# Patient Record
Sex: FEMALE | Race: WHITE | Employment: OTHER | URBAN - METROPOLITAN AREA
[De-identification: names, ages, dates, MRNs, and addresses within clinical notes are randomized per-mention and may not be internally consistent; named-entity substitution may affect disease eponyms.]

---

## 2018-07-10 PROBLEM — R93.1 AGATSTON CORONARY ARTERY CALCIUM SCORE LESS THAN 100: Status: ACTIVE | Noted: 2018-07-10

## 2019-08-12 PROBLEM — R07.82 INTERCOSTAL PAIN: Status: ACTIVE | Noted: 2019-08-12

## 2019-08-12 PROBLEM — R07.82 INTERCOSTAL PAIN: Status: RESOLVED | Noted: 2019-08-12 | Resolved: 2019-08-12

## 2019-08-12 PROBLEM — R00.0 TACHYCARDIA: Status: ACTIVE | Noted: 2019-08-12

## 2019-08-21 ENCOUNTER — HOSPITAL ENCOUNTER (OUTPATIENT)
Dept: CT IMAGING | Age: 64
Discharge: HOME | End: 2019-08-21
Attending: INTERNAL MEDICINE
Payer: MEDICARE

## 2019-08-21 DIAGNOSIS — R07.2 PRECORDIAL PAIN: ICD-10-CM

## 2019-08-21 DIAGNOSIS — R93.1 AGATSTON CORONARY ARTERY CALCIUM SCORE LESS THAN 100: ICD-10-CM

## 2019-08-21 LAB — CREAT BLD-MCNC: 0.7 MG/DL (ref 0.8–1.5)

## 2019-08-21 PROCEDURE — 74011000258 HC RX REV CODE- 258: Performed by: INTERNAL MEDICINE

## 2019-08-21 PROCEDURE — 74011636320 HC RX REV CODE- 636/320: Performed by: INTERNAL MEDICINE

## 2019-08-21 PROCEDURE — 75574 CT ANGIO HRT W/3D IMAGE: CPT

## 2019-08-21 PROCEDURE — 82565 ASSAY OF CREATININE: CPT

## 2019-08-21 RX ORDER — SODIUM CHLORIDE 0.9 % (FLUSH) 0.9 %
10 SYRINGE (ML) INJECTION
Status: COMPLETED | OUTPATIENT
Start: 2019-08-21 | End: 2019-08-21

## 2019-08-21 RX ADMIN — Medication 10 ML: at 08:17

## 2019-08-21 RX ADMIN — IOPAMIDOL 75 ML: 755 INJECTION, SOLUTION INTRAVENOUS at 08:17

## 2019-08-21 RX ADMIN — SODIUM CHLORIDE 100 ML: 900 INJECTION, SOLUTION INTRAVENOUS at 08:17

## 2020-07-23 PROBLEM — R25.2 MUSCLE CRAMPS: Status: ACTIVE | Noted: 2020-07-23

## 2020-10-28 PROBLEM — G89.29 CHRONIC RIGHT-SIDED LOW BACK PAIN WITH RIGHT-SIDED SCIATICA: Status: ACTIVE | Noted: 2020-10-28

## 2020-10-28 PROBLEM — M54.41 CHRONIC RIGHT-SIDED LOW BACK PAIN WITH RIGHT-SIDED SCIATICA: Status: ACTIVE | Noted: 2020-10-28

## 2022-03-18 PROBLEM — G89.29 CHRONIC RIGHT-SIDED LOW BACK PAIN WITH RIGHT-SIDED SCIATICA: Status: ACTIVE | Noted: 2020-10-28

## 2022-03-18 PROBLEM — M54.41 CHRONIC RIGHT-SIDED LOW BACK PAIN WITH RIGHT-SIDED SCIATICA: Status: ACTIVE | Noted: 2020-10-28

## 2022-03-19 PROBLEM — R00.0 TACHYCARDIA: Status: ACTIVE | Noted: 2019-08-12

## 2022-03-19 PROBLEM — R93.1 AGATSTON CORONARY ARTERY CALCIUM SCORE LESS THAN 100: Status: ACTIVE | Noted: 2018-07-10

## 2022-03-20 PROBLEM — R25.2 MUSCLE CRAMPS: Status: ACTIVE | Noted: 2020-07-23

## 2022-09-07 ENCOUNTER — OFFICE VISIT (OUTPATIENT)
Dept: CARDIOLOGY CLINIC | Age: 67
End: 2022-09-07

## 2022-09-07 VITALS
BODY MASS INDEX: 41.11 KG/M2 | DIASTOLIC BLOOD PRESSURE: 70 MMHG | SYSTOLIC BLOOD PRESSURE: 138 MMHG | WEIGHT: 240.8 LBS | HEART RATE: 63 BPM | HEIGHT: 64 IN

## 2022-09-07 DIAGNOSIS — I45.10 RIGHT BUNDLE BRANCH BLOCK (RBBB): ICD-10-CM

## 2022-09-07 DIAGNOSIS — E66.01 CLASS 3 SEVERE OBESITY DUE TO EXCESS CALORIES WITH SERIOUS COMORBIDITY AND BODY MASS INDEX (BMI) OF 40.0 TO 44.9 IN ADULT (HCC): ICD-10-CM

## 2022-09-07 DIAGNOSIS — R93.1 AGATSTON CORONARY ARTERY CALCIUM SCORE LESS THAN 100: ICD-10-CM

## 2022-09-07 DIAGNOSIS — I10 PRIMARY HYPERTENSION: Primary | ICD-10-CM

## 2022-09-07 DIAGNOSIS — E78.00 PURE HYPERCHOLESTEROLEMIA: ICD-10-CM

## 2022-09-07 PROBLEM — E66.813 CLASS 3 SEVERE OBESITY DUE TO EXCESS CALORIES WITH SERIOUS COMORBIDITY AND BODY MASS INDEX (BMI) OF 40.0 TO 44.9 IN ADULT: Status: ACTIVE | Noted: 2022-09-07

## 2022-09-07 PROCEDURE — 93000 ELECTROCARDIOGRAM COMPLETE: CPT | Performed by: INTERNAL MEDICINE

## 2022-09-07 PROCEDURE — 99214 OFFICE O/P EST MOD 30 MIN: CPT | Performed by: INTERNAL MEDICINE

## 2022-09-07 PROCEDURE — 1123F ACP DISCUSS/DSCN MKR DOCD: CPT | Performed by: INTERNAL MEDICINE

## 2022-09-07 ASSESSMENT — ENCOUNTER SYMPTOMS: SHORTNESS OF BREATH: 0
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
## 2023-01-03 ENCOUNTER — OFFICE VISIT (OUTPATIENT)
Dept: CARDIOLOGY CLINIC | Age: 68
End: 2023-01-03

## 2023-01-03 VITALS
SYSTOLIC BLOOD PRESSURE: 130 MMHG | DIASTOLIC BLOOD PRESSURE: 82 MMHG | HEART RATE: 60 BPM | BODY MASS INDEX: 42.68 KG/M2 | WEIGHT: 250 LBS | HEIGHT: 64 IN

## 2023-01-03 DIAGNOSIS — R00.2 PALPITATIONS: ICD-10-CM

## 2023-01-03 DIAGNOSIS — E66.01 CLASS 3 SEVERE OBESITY DUE TO EXCESS CALORIES WITH SERIOUS COMORBIDITY AND BODY MASS INDEX (BMI) OF 40.0 TO 44.9 IN ADULT (HCC): ICD-10-CM

## 2023-01-03 DIAGNOSIS — E78.2 MIXED HYPERLIPIDEMIA: ICD-10-CM

## 2023-01-03 DIAGNOSIS — I10 PRIMARY HYPERTENSION: ICD-10-CM

## 2023-01-03 DIAGNOSIS — R93.1 AGATSTON CORONARY ARTERY CALCIUM SCORE LESS THAN 100: Primary | ICD-10-CM

## 2023-01-03 DIAGNOSIS — I45.10 RIGHT BUNDLE BRANCH BLOCK (RBBB): ICD-10-CM

## 2023-01-03 PROCEDURE — 1123F ACP DISCUSS/DSCN MKR DOCD: CPT | Performed by: INTERNAL MEDICINE

## 2023-01-03 PROCEDURE — 99214 OFFICE O/P EST MOD 30 MIN: CPT | Performed by: INTERNAL MEDICINE

## 2023-01-03 PROCEDURE — 3079F DIAST BP 80-89 MM HG: CPT | Performed by: INTERNAL MEDICINE

## 2023-01-03 PROCEDURE — 3075F SYST BP GE 130 - 139MM HG: CPT | Performed by: INTERNAL MEDICINE

## 2023-01-03 ASSESSMENT — ENCOUNTER SYMPTOMS
WHEEZING: 0
EYE REDNESS: 0
STRIDOR: 0
HEMATEMESIS: 0
ABDOMINAL PAIN: 0
HEMOPTYSIS: 0
HOARSE VOICE: 0
HEMATOCHEZIA: 0
DOUBLE VISION: 0

## 2023-01-03 NOTE — PROGRESS NOTES
[facility] CARDIOLOGY  [address] [facility], [address] [facility], [address] [place] [address]  PHONE: [phone]          23    NAME:  Joey Hawthorne  : 1955  MRN: [id number]         SUBJECTIVE:   Joey Hawthorne is a 78 y.o. female seen for a visit regarding the following:     Chief Complaint   Patient presents with    Hypertension    Coronary Artery Disease    Hyperlipidemia           HPI:    Cardio problem list:  1. Coronary artery disease-based on an elevated CT coronary calcium score  -CT coronary angiogram from 2019 showed a total calcium score of 15-located in the RCA, left main was normal, normal LAD and circumflex coronary arteries. Minor nonobstructive calcification noted in the right coronary artery. 2.  Bifascicular block with right bundle branch block and left anterior fascicular block  3. Hypertension  4. Hyperlipidemia   5. Palpitations    I saw Ms Arminda Reeves who is a pleasant 79-year-old woman in cardiovascular follow-up for coronary artery disease-based on an elevated CT coronary calcium score, bifascicular block with right bundle branch block and left anterior fascicular block, with known underlying hypertension and hyperlipidemia. Coronary artery disease-based on elevated CT coronary calcium score-no complaints of any angina in any way. She has some chronic dyspnea on exertion but she attributes this to her weight. No significant lower extremity edema orthopnea PND. Bifascicular block-has a right bundle branch block with a left anterior fascicular block. No complaints of any presyncope or syncope. No TIAs or strokelike symptoms with regards to her palpitations. Hypertension-elevated blood pressures in the past although well controlled now with metoprolol and losartan/hydrochlorothiazide combination. Denies headaches or blurry vision. No heart failure-like symptoms as mentioned above. Hyperlipidemia-remains on statin therapy with no myalgias.     Past Medical History, Past Surgical History, Family history, Social History, and Medications were all reviewed with the patient today and updated as necessary. Allergies   Allergen Reactions    Penicillins Hives     Pt states not allergic to this med    Adalimumab      Other reaction(s): Itching-Allergy  Humira    Clarithromycin Other (See Comments)    Hydroxychloroquine      Other reaction(s): Other- (not listed) - Allergy    Lidocaine-Menthol      Other reaction(s): Other- (not listed) - Allergy    Metronidazole Other (See Comments)     Heart racing    Sulfa Antibiotics      Other reaction(s): Other- (not listed) - Allergy     Patient Active Problem List   Diagnosis    Chronic right-sided low back pain with right-sided sciatica    S/P laparoscopic hysterectomy    PAC (premature atrial contraction)    Right bundle branch block (RBBB)    Agatston coronary artery calcium score less than 100    Precordial pain    Thyroid mass    Primary hypertension    Pure hypercholesterolemia    Tachycardia    Muscle cramps    Class 3 severe obesity due to excess calories with serious comorbidity and body mass index (BMI) of 40.0 to 44.9 in [facility])    Mixed hyperlipidemia    Palpitations     Past Medical History:   Diagnosis Date    Arrhythmia     Arthritis     Asthma     uses inhaler daily, flares-up when exercises    Cancer ([address]     bladder, cervix, skin, thyroid    GERD (gastroesophageal reflux disease)     controlled with med    HTN (hypertension) 2/19/2016    Hypercholesterolemia     controlled with med    Hypertension     controlled with med    Leukorrhea, not specified as infective     Lumbago     Malignant neoplasm of bladder, part unspecified     Malignant neoplasm of ovary ([address]     Malignant neoplasm of thyroid gland ([address]     Other ill-defined conditions(799.89)     pt. stated she was told she has an  enlarged heart on ECHO in 2006, ECHO results placed on chart and results within anesthesia surgical protocol.  Pt states she last saw cardiologist     Palpitations     Polyp of vagina     Precordial chest pain 2016    Right bundle branch block (RBBB) 2016    Thyroid disease     hx of thyroid cancer; s/p total thyroidectomy ; pt takes synthroid daily    Unspecified adverse effect of anesthesia     headaches    Unspecified symptom associated with female genital organs     Urethral caruncle      Past Surgical History:   Procedure Laterality Date    CHOLECYSTECTOMY      COLONOSCOPY      HYSTERECTOMY (CERVIX STATUS UNKNOWN)      THYROIDECTOMY      total    TUBAL LIGATION       Family History   Problem Relation Age of Onset    Other Neg Hx     Post-op Cognitive Dysfunction Neg Hx     Emergence Delirium Neg Hx     Post-op Nausea/Vomiting Neg Hx     Delayed Awakening Neg Hx     Pseudochol. Deficiency Neg Hx     Malig Hypertherm Neg Hx     Stroke Father     Diabetes Father     Hypertension Father     Hypertension Mother     Heart Disease Father      Social History     Tobacco Use    Smoking status: Former     Types: Cigarettes     Quit date: 2004     Years since quittin.0    Smokeless tobacco: Never   Substance Use Topics    Alcohol use: No     Current Outpatient Medications   Medication Sig Dispense Refill    BIOTIN PO Take by mouth      TURMERIC PO Take by mouth      acetaminophen (TYLENOL) 650 MG extended release tablet Take 650 mg by mouth daily      albuterol sulfate  (90 Base) MCG/ACT inhaler Inhale 2 puffs into the lungs daily      vitamin D 25 MCG (1000 UT) CAPS Take by mouth daily      docusate (COLACE, DULCOLAX) 100 MG CAPS Take 100 mg by mouth daily      ipratropium (ATROVENT) 0.06 % nasal spray 2 sprays 4 times daily      levothyroxine (SYNTHROID) 137 MCG tablet Take 137 mcg by mouth every other day      levothyroxine (SYNTHROID) 150 MCG tablet Take 150 mcg by mouth every other day      loratadine (CLARITIN) 10 MG capsule Take by mouth      LORazepam (ATIVAN) 0.5 MG tablet Take 0.5 mg by mouth daily. losartan-hydroCHLOROthiazide (HYZAAR) 50-12.5 MG per tablet Take 1 tablet by mouth daily      metoprolol succinate (TOPROL XL) 50 MG extended release tablet Take 50 mg by mouth daily      omeprazole (PRILOSEC) 20 MG delayed release capsule Take 40 mg by mouth      rosuvastatin (CRESTOR) 20 MG tablet Take 20 mg by mouth      venlafaxine (EFFEXOR XR) 37.5 MG extended release capsule Take by mouth daily      venlafaxine (EFFEXOR XR) 150 MG extended release capsule Take by mouth daily      zolpidem (AMBIEN) 10 MG tablet Take by mouth. No current facility-administered medications for this visit. Review of Systems   Constitutional: Negative for chills and fever. HENT:  Negative for ear discharge, hoarse voice and stridor. Eyes:  Negative for double vision and redness. Cardiovascular:  Negative for cyanosis and syncope. Respiratory:  Negative for hemoptysis and wheezing. Endocrine: Negative for polydipsia and polyphagia. Hematologic/Lymphatic: Negative for adenopathy. Skin:  Negative for itching and rash. Musculoskeletal:  Negative for joint swelling and muscle weakness. Gastrointestinal:  Negative for abdominal pain, hematemesis and hematochezia. Genitourinary:  Negative for flank pain and nocturia. Neurological:  Negative for focal weakness and seizures. Psychiatric/Behavioral:  Negative for altered mental status and suicidal ideas. Allergic/Immunologic: Negative for hives. PHYSICAL EXAM:    /82   Pulse 60   Ht 5' 3.5\" (1.613 m)   Wt 250 lb (113.4 kg)   BMI 43.59 kg/m²      Physical Exam    General: Alert and oriented in no acute distress, ambulates with a cane  HEENT: Head is normocephalic, atraumatic, pupils are equal bilaterally, throat appears to be clear  Neck: No significant jugular venous distention no cervical bruits  Cardiovascular: S1 and S2 heard, regular rate and rhythm, no significant murmurs rubs or gallops.    Respiratory: Clear to auscultation bilaterally with no adventitious sounds, respirations are normal  Abdomen: Soft, nontender, nondistended, bowel sounds present. Extremities: No cyanosis clubbing or edema  Peripheral pulses: Bilateral radial artery pulses are palpated. Bilateral pedal pulses are well felt. Neuro: No facial droop and no gross focal motor deficits  Lymphatic: No significant cervical lymphadenopathy noted. Musculoskeletal: No significant redness or swelling noted in all exposed joints. Skin: No significant rashes noted the of the exposed regions. Medical problems and test results were reviewed with the patient today. No results found for this or any previous visit (from the past 672 hour(s)). Lab Results   Component Value Date/Time    CHOL 158 02/24/2021 02:18 PM    HDL 71 02/24/2021 02:18 PM    VLDL 18 02/24/2021 02:18 PM   ,hemoglobin, basic metabolic panel, No results found for: TSH, TSH2, TSH3 ,  Lab Results   Component Value Date/Time    GFRAA >60 08/21/2019 08:07 AM      Lab Results   Component Value Date    LDLCALC 69 02/24/2021      Lab Results   Component Value Date    CREATININE 0.7 (L) 08/21/2019      No results found for this or any previous visit. No results found for: HGB   No results found for: PLT     Blood work from September 2022 showed a total cholesterol of 142, HDL 58, triglycerides 107 and LDL at 63 hemoglobin was 12.7, platelets 458 sodium 141, potassium 4.2, creatinine 0.63, TSH 2.37, A1c 5.7      ASSESSMENT and PLAN      Agatston coronary artery calcium score less than 100  -Stable with no complaints of any angina-continue beta-blocker therapy. Continue statin therapy. Primary hypertension  Well-controlled with metoprolol succinate 50 mg once daily and losartan/hydrochlorothiazide 50/12.5 mg once daily    Right bundle branch block (RBBB)  Along with left anterior fascicular block-being [id number] metoprolol at current dosing.     Mixed hyperlipidemia  Continue rosuvastatin 20 mg every evening-last LDL was 63 at goal.    Class 3 severe obesity due to excess calories with serious comorbidity and body mass index (BMI) of 40.0 to 44.9 in adult [facility])  Understands the need for weight loss-has gained 30 pounds over the past year. Palpitations  -Continue metoprolol succinate. Episodes are far less frequent at this point. Overall Impression  -I made no changes with her medical therapy. Blood pressures and heart rates are excellent. Most recent lab work done through [facility] in September and November were reviewed by me and were all within normal range. Lipids are under excellent control. She has gained about 30 pounds in 2022 and understands the need for weight loss. She said she would try and get onto a diet within the near future. She will also get into a routine cardiovascular exercise program.  She said she used to walk regularly in the past.  She has an underlying bifascicular block but no evidence of any additional first-degree AV block. On metoprolol succinate but this has helped her palpitations and we will continue it. She does have a family history of A. fib. I would only need to see her in follow-up in about 6 months time. Although she has coronary artery disease based on her elevated CT coronary calcium score, it is well controlled with statin therapy and beta-blocker therapy with no complaints of angina. Return in about 6 months (around 7/3/2023) for htn, hld, cad. Thank you for allowing us to participate in the care of your patient. If you have any further questions, please do not hesitate to contact us.   Sincerely,        Hira Silva MD   1/3/2023

## 2023-01-03 NOTE — PATIENT INSTRUCTIONS
-Very important to increase fiber in your diet, cut out simple carbs such as sugars/bread/rice/pasta.  -Increase vegetable and fruit intake along with lean meats.

## 2023-03-16 RX ORDER — ROSUVASTATIN CALCIUM 20 MG/1
TABLET, COATED ORAL
Qty: 90 TABLET | OUTPATIENT
Start: 2023-03-16

## 2023-07-12 ENCOUNTER — OFFICE VISIT (OUTPATIENT)
Age: 68
End: 2023-07-12
Payer: MEDICARE

## 2023-07-12 VITALS
DIASTOLIC BLOOD PRESSURE: 84 MMHG | SYSTOLIC BLOOD PRESSURE: 134 MMHG | WEIGHT: 256.2 LBS | HEART RATE: 68 BPM | HEIGHT: 63 IN | BODY MASS INDEX: 45.39 KG/M2

## 2023-07-12 DIAGNOSIS — I10 PRIMARY HYPERTENSION: ICD-10-CM

## 2023-07-12 DIAGNOSIS — I45.10 RIGHT BUNDLE BRANCH BLOCK (RBBB): ICD-10-CM

## 2023-07-12 DIAGNOSIS — R93.1 AGATSTON CORONARY ARTERY CALCIUM SCORE LESS THAN 100: Primary | ICD-10-CM

## 2023-07-12 DIAGNOSIS — E66.01 CLASS 3 SEVERE OBESITY DUE TO EXCESS CALORIES WITH SERIOUS COMORBIDITY AND BODY MASS INDEX (BMI) OF 40.0 TO 44.9 IN ADULT (HCC): ICD-10-CM

## 2023-07-12 DIAGNOSIS — E78.2 MIXED HYPERLIPIDEMIA: ICD-10-CM

## 2023-07-12 PROCEDURE — 3079F DIAST BP 80-89 MM HG: CPT | Performed by: INTERNAL MEDICINE

## 2023-07-12 PROCEDURE — 99214 OFFICE O/P EST MOD 30 MIN: CPT | Performed by: INTERNAL MEDICINE

## 2023-07-12 PROCEDURE — 1123F ACP DISCUSS/DSCN MKR DOCD: CPT | Performed by: INTERNAL MEDICINE

## 2023-07-12 PROCEDURE — 3075F SYST BP GE 130 - 139MM HG: CPT | Performed by: INTERNAL MEDICINE

## 2023-07-12 RX ORDER — LOSARTAN POTASSIUM 100 MG/1
100 TABLET ORAL DAILY
COMMUNITY
Start: 2023-06-06

## 2023-07-12 ASSESSMENT — ENCOUNTER SYMPTOMS
STRIDOR: 0
HEMATOCHEZIA: 0
ABDOMINAL PAIN: 0
WHEEZING: 0
HEMOPTYSIS: 0
EYE REDNESS: 0
HOARSE VOICE: 0
DOUBLE VISION: 0
HEMATEMESIS: 0

## 2023-07-12 NOTE — PROGRESS NOTES
[facility] CARDIOLOGY  [address] [address]  PHONE: [phone]          23    NAME:  Barbara Barboza  : 1955  MRN: [id number]         SUBJECTIVE:   Barbara Barboza is a 78 y.o. female seen for a visit regarding the following:     Chief Complaint   Patient presents with    Hyperlipidemia    Hypertension           HPI:    Cardio problem list:  1. Coronary artery disease-based on an elevated CT coronary calcium score  -CT coronary angiogram from 2019 showed a total calcium score of 15-located in the RCA, left main was normal, normal LAD and circumflex coronary arteries. Minor nonobstructive calcification noted in the right coronary artery. 2.  Bifascicular block with right bundle branch block and left anterior fascicular block  3. Hypertension  4. Hyperlipidemia   5. Palpitations    I saw Ms Chano Nixon who is a pleasant 79-year-old woman in cardiovascular follow-up for coronary artery disease-based on an elevated CT coronary calcium score, bifascicular block with right bundle branch block and left anterior fascicular block, with known underlying hypertension and hyperlipidemia. Coronary artery disease-based on elevated CT coronary calcium score-no complaints of any angina in any way. She has some chronic dyspnea on exertion but she attributes this to her weight. No significant lower extremity edema orthopnea PND. Bifascicular block-has a right bundle branch block with a left anterior fascicular block. No complaints of any presyncope or syncope. No TIAs or strokelike symptoms with regards to her palpitations. Hypertension-elevated blood pressures in the past although well controlled now with metoprolol and losartan/hydrochlorothiazide combination with additional 100 mg of plain losartan. Denies headaches or blurry vision. No heart failure-like symptoms as mentioned above. Hyperlipidemia-remains on statin therapy with no myalgias.     Past Medical History,

## 2023-07-28 ENCOUNTER — TELEPHONE (OUTPATIENT)
Age: 68
End: 2023-07-28

## 2023-07-28 RX ORDER — ASPIRIN 81 MG/1
81 TABLET ORAL DAILY
Qty: 90 TABLET | Refills: 3
Start: 2023-07-28

## 2023-07-28 RX ORDER — NITROGLYCERIN 0.4 MG/1
0.4 TABLET SUBLINGUAL EVERY 5 MIN PRN
Qty: 25 TABLET | Refills: 3 | Status: SHIPPED | OUTPATIENT
Start: 2023-07-28

## 2023-07-28 NOTE — TELEPHONE ENCOUNTER
MD David Knox, KODAK  Caller: Unspecified (Today,  9:11 AM)  Low risk findings, ok to wait until primary cardiologist can review.

## 2023-07-28 NOTE — TELEPHONE ENCOUNTER
Did a stress test on July 25th and it is abnormal and that want Dr Ruthann Thorpe to look at it because he may want to bring her back in sooner.

## 2023-07-28 NOTE — TELEPHONE ENCOUNTER
Tedd Severance, MD Beauty Query, RN  Caller: Unspecified (Today,  9:11 AM)  Noted. If the stress test was done for chest pain, we can ensure that she is on aspirin 81 mg once daily along with having sublingual nitroglycerin 0.4 mg sublingual as needed.   -We can bring her in sooner-15-minute follow-up appointment.   -If she has any unstable symptoms, she would benefit from going to [facility]    Thanks,   AD

## 2023-07-28 NOTE — TELEPHONE ENCOUNTER
Attempted to call JEET, but recording states office is closed, this afternoon. Patient states JEET did nuclear stress test because she has been having frequent pressure in middle to left side of chest with increased SOB and some nausea, lasting about a few minutes. Chest discomfort increases when she is stressed and anxious. Chest discomfort does not increase on exertion. Very bad headache when  BP is high. States BP stays so high that home BP monitor will not read BP. Very high BP during nuclear stress test-198/100-217/100. No current chest pain. Is not taking ASA. Does not have NTG. Advised patient of Dr. Jamari Goddard response. Reviewed NTG instructions with patient. Advised patient to go to [facility] for immediate evaluation of any chest pain or SOB not relieved by NTG  or if she feels she is in distress. Scheduled next available appointment with Dr. Oj Cabrera on 7/31/23 at 2:00 pm at [place]. Patient verbalized understanding. Requested Prescriptions     Signed Prescriptions Disp Refills    aspirin 81 MG EC tablet 90 tablet 3     Sig: Take 1 tablet by mouth daily     Authorizing Provider: LAURA Browning     Ordering User: Sheron Naranjo    nitroGLYCERIN (NITROSTAT) 0.4 MG SL tablet 25 tablet 3     Sig: Place 1 tablet under the tongue every 5 minutes as needed for Chest pain up to max of 3 total doses. If no relief after 1 dose, call 911.      Authorizing Provider: Luh Drummond     Ordering User: Therman Naranjo     NTG, as above, E-prescribed to [facility] in [facility] at patient's request.

## 2023-07-31 ENCOUNTER — OFFICE VISIT (OUTPATIENT)
Age: 68
End: 2023-07-31
Payer: MEDICARE

## 2023-07-31 VITALS
HEIGHT: 63 IN | DIASTOLIC BLOOD PRESSURE: 84 MMHG | HEART RATE: 66 BPM | WEIGHT: 252 LBS | BODY MASS INDEX: 44.65 KG/M2 | SYSTOLIC BLOOD PRESSURE: 136 MMHG

## 2023-07-31 DIAGNOSIS — R94.39 ABNORMAL NUCLEAR STRESS TEST: ICD-10-CM

## 2023-07-31 DIAGNOSIS — I10 PRIMARY HYPERTENSION: ICD-10-CM

## 2023-07-31 DIAGNOSIS — I49.1 PAC (PREMATURE ATRIAL CONTRACTION): ICD-10-CM

## 2023-07-31 DIAGNOSIS — R93.1 AGATSTON CORONARY ARTERY CALCIUM SCORE LESS THAN 100: ICD-10-CM

## 2023-07-31 DIAGNOSIS — I20.0 UNSTABLE ANGINA (HCC): Primary | ICD-10-CM

## 2023-07-31 DIAGNOSIS — R06.09 DYSPNEA ON EXERTION: ICD-10-CM

## 2023-07-31 DIAGNOSIS — R06.02 SOB (SHORTNESS OF BREATH): ICD-10-CM

## 2023-07-31 PROCEDURE — 1123F ACP DISCUSS/DSCN MKR DOCD: CPT | Performed by: INTERNAL MEDICINE

## 2023-07-31 PROCEDURE — 99215 OFFICE O/P EST HI 40 MIN: CPT | Performed by: INTERNAL MEDICINE

## 2023-07-31 PROCEDURE — 93000 ELECTROCARDIOGRAM COMPLETE: CPT | Performed by: INTERNAL MEDICINE

## 2023-07-31 PROCEDURE — 3079F DIAST BP 80-89 MM HG: CPT | Performed by: INTERNAL MEDICINE

## 2023-07-31 PROCEDURE — 3075F SYST BP GE 130 - 139MM HG: CPT | Performed by: INTERNAL MEDICINE

## 2023-07-31 RX ORDER — AMLODIPINE BESYLATE 5 MG/1
5 TABLET ORAL DAILY
Qty: 30 TABLET | Refills: 11 | Status: SHIPPED | OUTPATIENT
Start: 2023-07-31

## 2023-07-31 RX ORDER — HYDROCHLOROTHIAZIDE 12.5 MG/1
12.5 CAPSULE, GELATIN COATED ORAL DAILY
COMMUNITY

## 2023-07-31 ASSESSMENT — ENCOUNTER SYMPTOMS
HOARSE VOICE: 0
STRIDOR: 0
ABDOMINAL PAIN: 0
EYE REDNESS: 0
HEMATEMESIS: 0
HEMATOCHEZIA: 0
DOUBLE VISION: 0
WHEEZING: 0
HEMOPTYSIS: 0

## 2023-07-31 NOTE — PROGRESS NOTES
hour(s)). Lab Results   Component Value Date/Time    CHOL 158 02/24/2021 02:18 PM    HDL 71 02/24/2021 02:18 PM    VLDL 18 02/24/2021 02:18 PM   ,hemoglobin, basic metabolic panel, No results found for: TSH, TSH2, TSH3 ,  Lab Results   Component Value Date/Time    GFRAA >60 08/21/2019 08:07 AM      Lab Results   Component Value Date    LDLCALC 69 02/24/2021      Lab Results   Component Value Date    CREATININE 0.7 (L) 08/21/2019      No results found for this or any previous visit. No results found for: HGB   No results found for: PLT     Blood work from September 2022 showed a total cholesterol of 142, HDL 58, triglycerides 107 and LDL at 63 hemoglobin was 12.7, platelets 832 sodium 141, potassium 4.2, creatinine 0.63, TSH 2.37, A1c 5.7      ASSESSMENT and PLAN    Unstable angina/abnormal nuclear stress test  -Abnormal nuclear stress test with both anterior and inferior ischemia-moderate sized areas-nuclear stress test was from 7/25/2023-already on aspirin, beta-blocker, added amlodipine for dual antianginal therapy. Continue statin therapy.  -Planning coronary angiography, left heart catheterization and possible PCI. Agatston coronary artery calcium score less than 100  -Stable with no complaints of any angina-continue beta-blocker therapy. Continue statin therapy. Primary hypertension  Well-controlled with metoprolol succinate 50 mg once daily and losartan/hydrochlorothiazide 50/12.5 mg once daily with regular losartan 100mg daily in addition. Right bundle branch block (RBBB)  RBBB with left anterior fascicular block-being [id number] metoprolol at current dosing.     Mixed hyperlipidemia  Continue rosuvastatin 20 mg every evening-last LDL was 63 at goal.    Class 3 severe obesity due to excess calories with serious comorbidity and body mass index (BMI) of 40.0 to 44.9 in adult [facility])  Understands the need for weight loss-has gained 30 pounds over the past

## 2023-08-02 NOTE — PROGRESS NOTES
Patient pre-assessment complete for Mari Ruvalcaba scheduled for [facility], arrival time 0600. Patient verified using . Patient instructed to bring a list of all home medications on the day of procedure. NPO status reinforced. Patient informed to take a full dose aspirin 325mg  or 81 mg x 4 on the day of procedure. Patient instructed to HOLD HCTZ. Instructed they can take all other medications excluding vitamins & supplements. Patient verbalizes understanding of all instructions & denies any questions at this time.

## 2023-08-03 ENCOUNTER — HOSPITAL ENCOUNTER (OUTPATIENT)
Age: 68
Setting detail: OUTPATIENT SURGERY
Discharge: HOME | End: 2023-08-03
Attending: INTERNAL MEDICINE | Admitting: INTERNAL MEDICINE
Payer: MEDICARE

## 2023-08-03 VITALS
DIASTOLIC BLOOD PRESSURE: 89 MMHG | HEART RATE: 54 BPM | HEIGHT: 63 IN | WEIGHT: 252 LBS | TEMPERATURE: 97.6 F | SYSTOLIC BLOOD PRESSURE: 139 MMHG | OXYGEN SATURATION: 97 % | BODY MASS INDEX: 44.65 KG/M2

## 2023-08-03 DIAGNOSIS — I20.0 UNSTABLE ANGINA (HCC): ICD-10-CM

## 2023-08-03 LAB
ANION GAP SERPL CALC-SCNC: 5 MMOL/L (ref 2–11)
BUN SERPL-MCNC: 18 MG/DL (ref 8–23)
CALCIUM SERPL-MCNC: 9 MG/DL (ref 8.3–10.4)
CHLORIDE SERPL-SCNC: 110 MMOL/L (ref 101–110)
CO2 SERPL-SCNC: 29 MMOL/L (ref 21–32)
CREAT SERPL-MCNC: 0.7 MG/DL (ref 0.6–1)
ECHO BSA: 2.25 M2
EKG ATRIAL RATE: 58 BPM
EKG DIAGNOSIS: NORMAL
EKG P AXIS: 43 DEGREES
EKG P-R INTERVAL: 166 MS
EKG Q-T INTERVAL: 460 MS
EKG QRS DURATION: 144 MS
EKG QTC CALCULATION (BAZETT): 451 MS
EKG R AXIS: -53 DEGREES
EKG T AXIS: -34 DEGREES
EKG VENTRICULAR RATE: 58 BPM
ERYTHROCYTE [DISTWIDTH] IN BLOOD BY AUTOMATED COUNT: 14 % (ref 11.9–14.6)
GLUCOSE SERPL-MCNC: 77 MG/DL (ref 65–100)
HCT VFR BLD AUTO: 43.4 % (ref 35.8–46.3)
HGB BLD-MCNC: 13.1 G/DL (ref 11.7–15.4)
MAGNESIUM SERPL-MCNC: 2.1 MG/DL (ref 1.8–2.4)
MCH RBC QN AUTO: 27.1 PG (ref 26.1–32.9)
MCHC RBC AUTO-ENTMCNC: 30.2 G/DL (ref 31.4–35)
MCV RBC AUTO: 89.9 FL (ref 82–102)
NRBC # BLD: 0 K/UL (ref 0–0.2)
PLATELET # BLD AUTO: 250 K/UL (ref 150–450)
PMV BLD AUTO: 10.2 FL (ref 9.4–12.3)
POTASSIUM SERPL-SCNC: 3.5 MMOL/L (ref 3.5–5.1)
RBC # BLD AUTO: 4.83 M/UL (ref 4.05–5.2)
SODIUM SERPL-SCNC: 144 MMOL/L (ref 133–143)
WBC # BLD AUTO: 7.8 K/UL (ref 4.3–11.1)

## 2023-08-03 PROCEDURE — C1769 GUIDE WIRE: HCPCS | Performed by: INTERNAL MEDICINE

## 2023-08-03 PROCEDURE — 85027 COMPLETE CBC AUTOMATED: CPT

## 2023-08-03 PROCEDURE — 80048 BASIC METABOLIC PNL TOTAL CA: CPT

## 2023-08-03 PROCEDURE — 83735 ASSAY OF MAGNESIUM: CPT

## 2023-08-03 PROCEDURE — 93010 ELECTROCARDIOGRAM REPORT: CPT | Performed by: INTERNAL MEDICINE

## 2023-08-03 PROCEDURE — C1894 INTRO/SHEATH, NON-LASER: HCPCS | Performed by: INTERNAL MEDICINE

## 2023-08-03 PROCEDURE — 2709999900 HC NON-CHARGEABLE SUPPLY: Performed by: INTERNAL MEDICINE

## 2023-08-03 PROCEDURE — 99153 MOD SED SAME PHYS/QHP EA: CPT | Performed by: INTERNAL MEDICINE

## 2023-08-03 PROCEDURE — 2500000003 HC RX 250 WO HCPCS: Performed by: INTERNAL MEDICINE

## 2023-08-03 PROCEDURE — 6360000002 HC RX W HCPCS: Performed by: INTERNAL MEDICINE

## 2023-08-03 PROCEDURE — 93005 ELECTROCARDIOGRAM TRACING: CPT | Performed by: INTERNAL MEDICINE

## 2023-08-03 PROCEDURE — 93458 L HRT ARTERY/VENTRICLE ANGIO: CPT | Performed by: INTERNAL MEDICINE

## 2023-08-03 PROCEDURE — 99152 MOD SED SAME PHYS/QHP 5/>YRS: CPT | Performed by: INTERNAL MEDICINE

## 2023-08-03 PROCEDURE — 6360000004 HC RX CONTRAST MEDICATION: Performed by: INTERNAL MEDICINE

## 2023-08-03 RX ORDER — LIDOCAINE HYDROCHLORIDE 10 MG/ML
INJECTION, SOLUTION INFILTRATION; PERINEURAL PRN
Status: DISCONTINUED | OUTPATIENT
Start: 2023-08-03 | End: 2023-08-03 | Stop reason: HOSPADM

## 2023-08-03 RX ORDER — MIDAZOLAM HYDROCHLORIDE 1 MG/ML
INJECTION INTRAMUSCULAR; INTRAVENOUS PRN
Status: DISCONTINUED | OUTPATIENT
Start: 2023-08-03 | End: 2023-08-03 | Stop reason: HOSPADM

## 2023-08-03 RX ORDER — SODIUM CHLORIDE 0.9 % (FLUSH) 0.9 %
5-40 SYRINGE (ML) INJECTION PRN
Status: DISCONTINUED | OUTPATIENT
Start: 2023-08-03 | End: 2023-08-03 | Stop reason: HOSPADM

## 2023-08-03 RX ORDER — SODIUM CHLORIDE 9 MG/ML
INJECTION, SOLUTION INTRAVENOUS CONTINUOUS
Status: DISCONTINUED | OUTPATIENT
Start: 2023-08-03 | End: 2023-08-03 | Stop reason: HOSPADM

## 2023-08-03 RX ORDER — SODIUM CHLORIDE 9 MG/ML
INJECTION, SOLUTION INTRAVENOUS PRN
Status: DISCONTINUED | OUTPATIENT
Start: 2023-08-03 | End: 2023-08-03 | Stop reason: HOSPADM

## 2023-08-03 RX ORDER — SODIUM CHLORIDE 0.9 % (FLUSH) 0.9 %
5-40 SYRINGE (ML) INJECTION EVERY 12 HOURS SCHEDULED
Status: DISCONTINUED | OUTPATIENT
Start: 2023-08-03 | End: 2023-08-03 | Stop reason: HOSPADM

## 2023-08-03 RX ORDER — HEPARIN SODIUM 200 [USP'U]/100ML
INJECTION, SOLUTION INTRAVENOUS CONTINUOUS PRN
Status: COMPLETED | OUTPATIENT
Start: 2023-08-03 | End: 2023-08-03

## 2023-08-03 RX ORDER — ASPIRIN 81 MG/1
324 TABLET, CHEWABLE ORAL ONCE
Status: DISCONTINUED | OUTPATIENT
Start: 2023-08-03 | End: 2023-08-03 | Stop reason: HOSPADM

## 2023-08-03 RX ORDER — ACETAMINOPHEN 325 MG/1
650 TABLET ORAL EVERY 4 HOURS PRN
Status: DISCONTINUED | OUTPATIENT
Start: 2023-08-03 | End: 2023-08-03 | Stop reason: HOSPADM

## 2023-08-03 NOTE — PROGRESS NOTES
[facility] Dr Supa Haney  Diagnostic  RRA - 12 ml air in band @ 0839  Versed 3 mg IV  Fent 50 mcg IV  Heparin 5000 units in RC

## 2023-08-03 NOTE — PROGRESS NOTES
Right radial TR band removed no bleeding or hematoma noted. Gauze and tegaderm applied. Discharge instructions reviewed with patient including post cath care. Int removed with cath intact.

## 2023-08-03 NOTE — DISCHARGE INSTRUCTIONS
HEART CATHETERIZATION/ANGIOGRAPHY DISCHARGE INSTRUCTIONS    Check puncture site frequently for swelling or bleeding. If there is any bleeding, apply pressure over the area with a clean towel or washcloth and call 911. Notify your doctor for any redness, swelling, drainage, or oozing from the puncture site. Notify your doctor for any fever or chills. If the extremity becomes cold, numb, or painful call Dr. Dennie Ledger at [phone]. Activity should be limited for the next 48 hours. No heavy lifting, pushing, pulling  or strenuous activity for 48 hours. No heavy lifting (anything over 10 pounds) for 3 days. You may resume your usual diet. Drink more fluids than usual.  Have a responsible person drive you home and stay with you for at least 24 hours after your heart catheterization/angiography. You may remove bandage from your right wrist in 24 hours. You may shower in 24 hours. No tub baths, hot tubs, or swimming for 1 week. Do not place any lotions, creams, powders, or ointments over puncture site for 1 week. You may place a clean band-aid over the puncture site each day for 5 days. Change daily. Sedation for a Medical Procedure: Care Instructions     You were given a sedative medication during your visit. While many of the effects will have worn   off before you leave; you may continue to feel some effects for several hours. Common side effects from sedation include:  Feeling sleepy. (Your doctors and nurses will make sure you are not too sleepy to go home.)  Nausea and vomiting. This usually does not last long. Feeling tired. How can you care for yourself at home? Activity    Don't do anything for 24 hours that requires attention to detail. It takes time for the medicine effects to completely wear off. Do not make important legal decisions for 24 hours. Do not sign any legal documents for 24 hours.      Do not drink alcohol today     For your safety, you should not drive or operate heavy

## 2023-08-03 NOTE — PROGRESS NOTES
Patient received to [facility] room # 10  Ambulatory from [facility]. Patient scheduled for [facility] today with Dr Percy Lopez. Procedure reviewed & questions answered, voiced good understanding consent obtained & placed on chart. All medications and medical history reviewed. Will prep patient per orders. Patient & family updated on plan of care. The patient has a fraility score of 3-MANAGING WELL, based on patient A&Ox3, patient able to ambulate to room without difficulty.     Patient took aspirin 324mg at 0500 prior to arrival.

## 2024-01-22 ENCOUNTER — OFFICE VISIT (OUTPATIENT)
Age: 69
End: 2024-01-22
Payer: MEDICARE

## 2024-01-22 VITALS
DIASTOLIC BLOOD PRESSURE: 82 MMHG | BODY MASS INDEX: 46.71 KG/M2 | WEIGHT: 263.6 LBS | HEIGHT: 63 IN | SYSTOLIC BLOOD PRESSURE: 136 MMHG | HEART RATE: 72 BPM

## 2024-01-22 DIAGNOSIS — I45.10 RIGHT BUNDLE BRANCH BLOCK (RBBB): ICD-10-CM

## 2024-01-22 DIAGNOSIS — E78.2 MIXED HYPERLIPIDEMIA: ICD-10-CM

## 2024-01-22 DIAGNOSIS — I10 PRIMARY HYPERTENSION: ICD-10-CM

## 2024-01-22 DIAGNOSIS — R93.1 AGATSTON CORONARY ARTERY CALCIUM SCORE LESS THAN 100: Primary | ICD-10-CM

## 2024-01-22 PROCEDURE — 3079F DIAST BP 80-89 MM HG: CPT | Performed by: INTERNAL MEDICINE

## 2024-01-22 PROCEDURE — 3075F SYST BP GE 130 - 139MM HG: CPT | Performed by: INTERNAL MEDICINE

## 2024-01-22 PROCEDURE — 99214 OFFICE O/P EST MOD 30 MIN: CPT | Performed by: INTERNAL MEDICINE

## 2024-01-22 PROCEDURE — 1123F ACP DISCUSS/DSCN MKR DOCD: CPT | Performed by: INTERNAL MEDICINE

## 2024-01-22 ASSESSMENT — ENCOUNTER SYMPTOMS
HEMATOCHEZIA: 0
HEMATEMESIS: 0
DOUBLE VISION: 0
WHEEZING: 0
EYE REDNESS: 0
STRIDOR: 0
HEMOPTYSIS: 0
HOARSE VOICE: 0
ABDOMINAL PAIN: 0

## 2024-01-22 NOTE — PROGRESS NOTES
anginal type chest discomfort.  Some dyspnea on exertion-likely multifactorial.  She has gained about 10 pounds since she last saw us and understands the need for weight loss.  -She remains on appropriate medical therapy-appropriate dual antianginal therapy, high intensity statin therapy.  Counseled on the importance of following a low-carb, low sugar diet especially after 6 PM.  Somewhat of routine cardiovascular exercise-30 minutes 5 days a week has been encouraged.    Return in about 6 months (around 7/22/2024) for cad, htn, hld, webb.     Thank you for allowing us to participate in the care of your patient.  If you have any further questions, please do not hesitate to contact us.  Sincerely,        Higinio Dias MD   1/22/2024

## 2024-07-09 DIAGNOSIS — I10 PRIMARY HYPERTENSION: ICD-10-CM

## 2024-07-09 DIAGNOSIS — I20.0 UNSTABLE ANGINA (HCC): ICD-10-CM

## 2024-07-09 RX ORDER — AMLODIPINE BESYLATE 5 MG/1
5 TABLET ORAL DAILY
Qty: 90 TABLET | Refills: 3 | Status: SHIPPED | OUTPATIENT
Start: 2024-07-09

## 2024-07-09 NOTE — TELEPHONE ENCOUNTER
Requested Prescriptions     Pending Prescriptions Disp Refills    amLODIPine (NORVASC) 5 MG tablet [Pharmacy Med Name: AMLODIPINE BESYLATE 5 MG TAB] 90 tablet 3     Sig: TAKE 1 TABLET BY MOUTH EVERY DAY

## 2024-07-15 ENCOUNTER — OFFICE VISIT (OUTPATIENT)
Age: 69
End: 2024-07-15
Payer: MEDICARE

## 2024-07-15 VITALS
DIASTOLIC BLOOD PRESSURE: 82 MMHG | WEIGHT: 263.4 LBS | BODY MASS INDEX: 46.67 KG/M2 | SYSTOLIC BLOOD PRESSURE: 138 MMHG | HEART RATE: 72 BPM | HEIGHT: 63 IN

## 2024-07-15 DIAGNOSIS — I10 PRIMARY HYPERTENSION: ICD-10-CM

## 2024-07-15 DIAGNOSIS — E78.2 MIXED HYPERLIPIDEMIA: ICD-10-CM

## 2024-07-15 DIAGNOSIS — R06.09 DYSPNEA ON EXERTION: ICD-10-CM

## 2024-07-15 DIAGNOSIS — E66.01 CLASS 3 SEVERE OBESITY DUE TO EXCESS CALORIES WITH SERIOUS COMORBIDITY AND BODY MASS INDEX (BMI) OF 40.0 TO 44.9 IN ADULT (HCC): ICD-10-CM

## 2024-07-15 DIAGNOSIS — I45.10 RIGHT BUNDLE BRANCH BLOCK (RBBB): ICD-10-CM

## 2024-07-15 DIAGNOSIS — R93.1 AGATSTON CORONARY ARTERY CALCIUM SCORE LESS THAN 100: Primary | ICD-10-CM

## 2024-07-15 PROCEDURE — 1123F ACP DISCUSS/DSCN MKR DOCD: CPT | Performed by: INTERNAL MEDICINE

## 2024-07-15 PROCEDURE — 3075F SYST BP GE 130 - 139MM HG: CPT | Performed by: INTERNAL MEDICINE

## 2024-07-15 PROCEDURE — 99214 OFFICE O/P EST MOD 30 MIN: CPT | Performed by: INTERNAL MEDICINE

## 2024-07-15 PROCEDURE — 3079F DIAST BP 80-89 MM HG: CPT | Performed by: INTERNAL MEDICINE

## 2024-07-15 RX ORDER — IRON FUM,PS CMP/VIT C/NIACIN 125-40-3MG
1 CAPSULE ORAL DAILY
COMMUNITY
Start: 2024-07-01

## 2024-07-15 RX ORDER — CELECOXIB 200 MG/1
200 CAPSULE ORAL 2 TIMES DAILY PRN
COMMUNITY
Start: 2024-06-20

## 2024-07-15 ASSESSMENT — ENCOUNTER SYMPTOMS
STRIDOR: 0
WHEEZING: 0
HEMOPTYSIS: 0
BACK PAIN: 1
DOUBLE VISION: 0
HEMATEMESIS: 0
HOARSE VOICE: 0
EYE REDNESS: 0
HEMATOCHEZIA: 0
ABDOMINAL PAIN: 0

## 2024-07-15 NOTE — PROGRESS NOTES
[facility] CARDIOLOGY  [address] [place] [address], [place] [address]  PHONE: [phone]          07/15/24    NAME:  Elda Grayson  : 1955  MRN: [id number]         SUBJECTIVE:   Elda Grayson is a 68 y.o. female seen for a visit regarding the following:     Chief Complaint   Patient presents with   • Follow-up     6 months   • Hyperlipidemia   • Hypertension           HPI:    Cardio problem list:  1.  Coronary artery disease-based on an elevated CT coronary calcium score  -[facility]-8/3/2023 minor nonobstructive disease  -Nuclear Stress test from 2023 with EF at 61% but moderate-sized moderate severity reversible basal to mid anterior wall and moderate-sized basal inferior wall reversible ischemia  -CT coronary angiogram from 2019 showed a total calcium score of 15-located in the RCA, left main was normal, normal LAD and circumflex coronary arteries.  Minor nonobstructive calcification noted in the right coronary artery.  2.  Bifascicular block with right bundle branch block and left anterior fascicular block  3.  Hypertension  4.  Hyperlipidemia   5.  Palpitations  6. GERARDO on CPAP    I saw Ms Grayson who is a pleasant 68-year-old woman in cardiovascular follow-up for coronary artery disease-based on heart cath from 8/3/2023 with minor nonobstructive disease,bifascicular block with right bundle branch block and left anterior fascicular block, with known underlying hypertension and hyperlipidemia.    We made no changes when we last saw her 6 months ago.  She had previously gained about 30 pounds but fortunately has not gained any more since we last saw her.    Coronary artery disease-/chest pain-overall much better since we last saw her.  She still has some occasional twinges, some chest tightness and dyspnea on exertion.  Has been reassured with coronary angiography from 2023 with minimal nonobstructive disease.    Bifascicular block-has a right bundle branch block with a left anterior fascicular

## 2025-01-28 ENCOUNTER — OFFICE VISIT (OUTPATIENT)
Age: 70
End: 2025-01-28
Payer: MEDICARE

## 2025-01-28 VITALS
WEIGHT: 257 LBS | BODY MASS INDEX: 45.54 KG/M2 | SYSTOLIC BLOOD PRESSURE: 124 MMHG | DIASTOLIC BLOOD PRESSURE: 80 MMHG | HEIGHT: 63 IN | HEART RATE: 58 BPM

## 2025-01-28 DIAGNOSIS — R93.1 AGATSTON CORONARY ARTERY CALCIUM SCORE LESS THAN 100: Primary | ICD-10-CM

## 2025-01-28 DIAGNOSIS — I10 PRIMARY HYPERTENSION: ICD-10-CM

## 2025-01-28 DIAGNOSIS — R06.09 DYSPNEA ON EXERTION: ICD-10-CM

## 2025-01-28 DIAGNOSIS — E78.2 MIXED HYPERLIPIDEMIA: ICD-10-CM

## 2025-01-28 PROCEDURE — 1159F MED LIST DOCD IN RCRD: CPT | Performed by: INTERNAL MEDICINE

## 2025-01-28 PROCEDURE — 1123F ACP DISCUSS/DSCN MKR DOCD: CPT | Performed by: INTERNAL MEDICINE

## 2025-01-28 PROCEDURE — 3074F SYST BP LT 130 MM HG: CPT | Performed by: INTERNAL MEDICINE

## 2025-01-28 PROCEDURE — 93000 ELECTROCARDIOGRAM COMPLETE: CPT | Performed by: INTERNAL MEDICINE

## 2025-01-28 PROCEDURE — 1125F AMNT PAIN NOTED PAIN PRSNT: CPT | Performed by: INTERNAL MEDICINE

## 2025-01-28 PROCEDURE — 3078F DIAST BP <80 MM HG: CPT | Performed by: INTERNAL MEDICINE

## 2025-01-28 PROCEDURE — 1160F RVW MEDS BY RX/DR IN RCRD: CPT | Performed by: INTERNAL MEDICINE

## 2025-01-28 PROCEDURE — 99214 OFFICE O/P EST MOD 30 MIN: CPT | Performed by: INTERNAL MEDICINE

## 2025-01-28 ASSESSMENT — ENCOUNTER SYMPTOMS
ABDOMINAL PAIN: 0
BACK PAIN: 1
WHEEZING: 0
HEMATEMESIS: 0
STRIDOR: 0
HEMOPTYSIS: 0
HEMATOCHEZIA: 0
DOUBLE VISION: 0
EYE REDNESS: 0
HOARSE VOICE: 0

## 2025-01-28 NOTE — PROGRESS NOTES
Current Outpatient Medications   Medication Sig Dispense Refill    celecoxib (CELEBREX) 200 MG capsule Take 1 capsule by mouth 2 times daily as needed      amLODIPine (NORVASC) 5 MG tablet TAKE 1 TABLET BY MOUTH EVERY DAY 90 tablet 3    hydroCHLOROthiazide (MICROZIDE) 12.5 MG capsule Take 1 capsule by mouth daily      Misc. Devices (CPAP MACHINE) MISC by Does not apply route nightly      nitroGLYCERIN (NITROSTAT) 0.4 MG SL tablet Place 1 tablet under the tongue every 5 minutes as needed for Chest pain up to max of 3 total doses. If no relief after 1 dose, call 911. 25 tablet 3    losartan (COZAAR) 100 MG tablet Take 1 tablet by mouth daily      acetaminophen (TYLENOL) 650 MG extended release tablet Take 1 tablet by mouth daily      albuterol sulfate  (90 Base) MCG/ACT inhaler Inhale 2 puffs into the lungs daily      vitamin D 25 MCG (1000 UT) CAPS Take by mouth daily      docusate (COLACE, DULCOLAX) 100 MG CAPS Take 50 mg by mouth daily      ipratropium (ATROVENT) 0.06 % nasal spray 2 sprays 4 times daily      levothyroxine (SYNTHROID) 150 MCG tablet Take 1 tablet by mouth every other day      loratadine (CLARITIN) 10 MG capsule Take by mouth      metoprolol succinate (TOPROL XL) 50 MG extended release tablet Take 1 tablet by mouth daily      omeprazole (PRILOSEC) 20 MG delayed release capsule Take 1 capsule by mouth      rosuvastatin (CRESTOR) 20 MG tablet Take 1 tablet by mouth      venlafaxine (EFFEXOR XR) 37.5 MG extended release capsule Take by mouth daily      venlafaxine (EFFEXOR XR) 150 MG extended release capsule Take by mouth daily      Fe Fum-FePoly-Vit C-Vit B3 (INTEGRA) 62.5-62.5-40-3 MG CAPS Take 1 tablet by mouth daily (Patient not taking: Reported on 1/28/2025)      LORazepam (ATIVAN) 0.5 MG tablet Take 1 tablet by mouth daily. (Patient not taking: Reported on 1/28/2025)      zolpidem (AMBIEN) 10 MG tablet Take by mouth nightly. (Patient not taking: Reported on 1/28/2025)       No current

## 2025-05-23 ENCOUNTER — TRANSCRIBE ORDERS (OUTPATIENT)
Dept: SCHEDULING | Age: 70
End: 2025-05-23

## 2025-05-23 ENCOUNTER — HOSPITAL ENCOUNTER (OUTPATIENT)
Dept: ULTRASOUND IMAGING | Age: 70
Discharge: HOME | End: 2025-05-26
Payer: MEDICARE

## 2025-05-23 DIAGNOSIS — R79.89 POSITIVE D-DIMER: ICD-10-CM

## 2025-05-23 DIAGNOSIS — M79.662 PAIN OF LEFT LOWER LEG: ICD-10-CM

## 2025-05-23 DIAGNOSIS — M79.662 PAIN OF LEFT LOWER LEG: Primary | ICD-10-CM

## 2025-05-23 PROCEDURE — 93971 EXTREMITY STUDY: CPT

## 2025-05-24 PROCEDURE — 93971 EXTREMITY STUDY: CPT | Performed by: RADIOLOGY

## 2025-05-28 DIAGNOSIS — I10 PRIMARY HYPERTENSION: ICD-10-CM

## 2025-05-28 DIAGNOSIS — I20.0 UNSTABLE ANGINA (HCC): ICD-10-CM

## 2025-05-28 RX ORDER — AMLODIPINE BESYLATE 5 MG/1
5 TABLET ORAL DAILY
Qty: 90 TABLET | Refills: 3 | Status: SHIPPED | OUTPATIENT
Start: 2025-05-28

## 2025-05-28 NOTE — TELEPHONE ENCOUNTER
Requested Prescriptions     Pending Prescriptions Disp Refills    amLODIPine (NORVASC) 5 MG tablet [Pharmacy Med Name: AMLODIPINE BESYLATE 5 MG TAB] 90 tablet 3     Sig: TAKE 1 TABLET BY MOUTH EVERY DAY        Verified rx. Last OV 1/28/25. Erx to pharm on file.

## 2025-07-09 ENCOUNTER — OFFICE VISIT (OUTPATIENT)
Age: 70
End: 2025-07-09
Payer: MEDICARE

## 2025-07-09 VITALS
WEIGHT: 224 LBS | RESPIRATION RATE: 18 BRPM | HEART RATE: 53 BPM | SYSTOLIC BLOOD PRESSURE: 122 MMHG | DIASTOLIC BLOOD PRESSURE: 76 MMHG | BODY MASS INDEX: 39.69 KG/M2 | HEIGHT: 63 IN

## 2025-07-09 DIAGNOSIS — R93.1 AGATSTON CORONARY ARTERY CALCIUM SCORE LESS THAN 100: Primary | ICD-10-CM

## 2025-07-09 DIAGNOSIS — R07.2 PRECORDIAL CHEST PAIN: ICD-10-CM

## 2025-07-09 DIAGNOSIS — E78.2 MIXED HYPERLIPIDEMIA: ICD-10-CM

## 2025-07-09 DIAGNOSIS — I45.10 RIGHT BUNDLE BRANCH BLOCK (RBBB): ICD-10-CM

## 2025-07-09 DIAGNOSIS — I10 PRIMARY HYPERTENSION: ICD-10-CM

## 2025-07-09 DIAGNOSIS — R06.09 DYSPNEA ON EXERTION: ICD-10-CM

## 2025-07-09 PROCEDURE — 1123F ACP DISCUSS/DSCN MKR DOCD: CPT | Performed by: INTERNAL MEDICINE

## 2025-07-09 PROCEDURE — 3078F DIAST BP <80 MM HG: CPT | Performed by: INTERNAL MEDICINE

## 2025-07-09 PROCEDURE — 99214 OFFICE O/P EST MOD 30 MIN: CPT | Performed by: INTERNAL MEDICINE

## 2025-07-09 PROCEDURE — 1160F RVW MEDS BY RX/DR IN RCRD: CPT | Performed by: INTERNAL MEDICINE

## 2025-07-09 PROCEDURE — 3074F SYST BP LT 130 MM HG: CPT | Performed by: INTERNAL MEDICINE

## 2025-07-09 PROCEDURE — 1159F MED LIST DOCD IN RCRD: CPT | Performed by: INTERNAL MEDICINE

## 2025-07-09 PROCEDURE — 1125F AMNT PAIN NOTED PAIN PRSNT: CPT | Performed by: INTERNAL MEDICINE

## 2025-07-09 RX ORDER — MONTELUKAST SODIUM 5 MG/1
5 TABLET, CHEWABLE ORAL
COMMUNITY
Start: 2025-06-26

## 2025-07-09 RX ORDER — PREGABALIN 100 MG/1
100 CAPSULE ORAL 2 TIMES DAILY
COMMUNITY
Start: 2025-06-13 | End: 2025-08-12

## 2025-07-09 RX ORDER — METHOCARBAMOL 500 MG/1
500 TABLET, FILM COATED ORAL
COMMUNITY
Start: 2025-06-13

## 2025-07-09 RX ORDER — FLUTICASONE PROPIONATE 50 MCG
1 SPRAY, SUSPENSION (ML) NASAL 2 TIMES DAILY
COMMUNITY
Start: 2025-06-26

## 2025-07-09 RX ORDER — NITROGLYCERIN 0.4 MG/1
0.4 TABLET SUBLINGUAL EVERY 5 MIN PRN
Qty: 25 TABLET | Refills: 3 | Status: SHIPPED | OUTPATIENT
Start: 2025-07-09

## 2025-07-09 ASSESSMENT — ENCOUNTER SYMPTOMS
WHEEZING: 0
DOUBLE VISION: 0
STRIDOR: 0
ABDOMINAL PAIN: 0
HOARSE VOICE: 0
EYE REDNESS: 0
HEMOPTYSIS: 0
HEMATEMESIS: 0
HEMATOCHEZIA: 0

## 2025-07-09 NOTE — PROGRESS NOTES
[facility] CARDIOLOGY  [address] [place] [address], [address]  [place] [address]  PHONE: [phone]          25    NAME:  Elda Grayson  : 1955  MRN: [id number]         SUBJECTIVE:   Elda Grayson is a 69 y.o. female seen for a visit regarding the following:     Chief Complaint   Patient presents with    Hypertension     [place]at[place] coronary artery calcium score less than 100           HPI:    Cardio problem list:  1.  Coronary artery disease-based on an elevated CT coronary calcium score  -[facility]-8/3/2023 minor nonobstructive disease  -Nuclear Stress test from 2023 with EF at 61% but moderate-sized moderate severity reversible basal to mid anterior wall and moderate-sized basal inferior wall reversible ischemia  -CT coronary angiogram from 2019 showed a total calcium score of 15-located in the RCA, left main was normal, normal LAD and circumflex coronary arteries.  Minor nonobstructive calcification noted in the right coronary artery.  2.  Bifascicular block with right bundle branch block and left anterior fascicular block  3.  Hypertension  4.  Hyperlipidemia   5.  Palpitations  6. GERARDO on CPAP    I saw Ms Grayson who is a pleasant 69-year-old woman in cardiovascular follow-up for coronary artery disease-based on heart cath from 8/3/2023 with minor nonobstructive disease,bifascicular block with right bundle branch block and left anterior fascicular block, with known underlying hypertension and hyperlipidemia.    We made no changes when we last saw her 6 months ago.  She has lost about 30 pounds since she last saw us.  She had previously lost weight but had regained a fair amount.  Has struggled with chronic back pain-has received some injections for the same.  Ambulating with a cane.    Coronary artery disease-/chest pain-overall much better since we last saw her.  She still has some occasional twinges, some chest tightness and dyspnea on exertion.  Has been reassured with coronary angiography from August

## (undated) DEVICE — CATHETER COR DIAG PIGTAILS PIG 145 CRV 5FR 110CM 6 SIDE H

## (undated) DEVICE — GUIDEWIRE VASC L260CM DIA0.035IN RAD 3MM J TIP L7CM PTFE

## (undated) DEVICE — CATHETER DIAG AD 5FR L100CM COR NYL JUDKINS R 5 DILATED

## (undated) DEVICE — CATHETER ANGIO 5FR L100CM GRY S STL NYL JL3.5 3 SEG BRAID L

## (undated) DEVICE — BAND COMPR L24CM REG CLR PLAS HEMSTAT EXT HK AND LOOP RETEN

## (undated) DEVICE — GLIDESHEATH SLENDER STAINLESS STEEL KIT: Brand: GLIDESHEATH SLENDER